# Patient Record
Sex: MALE | Race: BLACK OR AFRICAN AMERICAN | NOT HISPANIC OR LATINO
[De-identification: names, ages, dates, MRNs, and addresses within clinical notes are randomized per-mention and may not be internally consistent; named-entity substitution may affect disease eponyms.]

---

## 2022-09-01 ENCOUNTER — APPOINTMENT (OUTPATIENT)
Dept: ORTHOPEDIC SURGERY | Facility: CLINIC | Age: 52
End: 2022-09-01

## 2022-09-01 VITALS — WEIGHT: 255 LBS | BODY MASS INDEX: 37.77 KG/M2 | HEIGHT: 69 IN

## 2022-09-01 DIAGNOSIS — S93.492A SPRAIN OF OTHER LIGAMENT OF LEFT ANKLE, INITIAL ENCOUNTER: ICD-10-CM

## 2022-09-01 DIAGNOSIS — S93.412A SPRAIN OF CALCANEOFIBULAR LIGAMENT OF LEFT ANKLE, INITIAL ENCOUNTER: ICD-10-CM

## 2022-09-01 PROCEDURE — 73610 X-RAY EXAM OF ANKLE: CPT | Mod: LT

## 2022-09-01 PROCEDURE — 99072 ADDL SUPL MATRL&STAF TM PHE: CPT

## 2022-09-01 PROCEDURE — 99203 OFFICE O/P NEW LOW 30 MIN: CPT

## 2022-09-01 NOTE — ASSESSMENT
[FreeTextEntry1] : 53 yo male presenting with left ATFL/CFL ankle sprain. X-rays demonstrating chronic avulsion fracture over lateral malleolus. No acute orthopaedic intervention. \par -LLE WBAT in lace up ankle support, rx given\par -Activities as tolerated, avoid strenuous/impact related activities at this time\par -Rx PT given today\par -Patient able to work on a light duty status at this time\par -Rest, ice, compression, elevation, NSAIDs PRN for pain. Discussed with patient that she should only take NSAIDs PRN as taking meds chronically can cause kidney damage. Patient understands\par -All questions answered \par -F/u 4 weeks\par

## 2022-09-01 NOTE — HISTORY OF PRESENT ILLNESS
[Work related] : work related [Sudden] : sudden [4] : 4 [Dull/Aching] : dull/aching [Throbbing] : throbbing [Intermittent] : intermittent [Household chores] : household chores [Leisure] : leisure [Sleep] : sleep [Social interactions] : social interactions [Rest] : rest [Not working due to injury] : Work status: not working due to injury [de-identified] : Patient is here for his left ankle. 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 Patient went to [facility] in [place]. Patient states he is having lateral pain that is throbbing.  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

## 2022-09-01 NOTE — WORK
[Sprain/Strain] : sprain/strain [Was the competent medical cause of the injury] : was the competent medical cause of the injury [Are consistent with the injury] : are consistent with the injury [Consistent with my objective findings] : consistent with my objective findings [Partial] : partial [Does not reveal pre-existing condition(s) that may affect treatment/prognosis] : does not reveal pre-existing condition(s) that may affect treatment/prognosis [Can return to work with limitations on ______] : can return to work with limitations on [unfilled] [Patient] : patient [No Rx restrictions] : No Rx restrictions. [I provided the services listed above] :  I provided the services listed above. [Light Work:] : Light Work: Exerting up to 20 pounds of force occasionally, and/or up to 10 pounds of force frequently and/or negligible amount of force constantly to move objects. Physical demand requirements are in excess of those for Sedentary Work. Even though the weight lifted may only be a negligible amount, a job should be rated Light Work: (1) when it requires walking or standing to a significant degree: or (2) when it requires sitting most of the time but entails pushing and/or pulling of arm or leg controls: and/or (3) when the job requires working at a production rate pace entailing the constant pushing and/or pulling of materials even though the weight of those materials is negligible. NOTE: The constant stress of maintaining a production rate pace, especially in an industrial setting, can be and is physically demanding of a worker even though the amount of force exerted is negligible. [FreeTextEntry1] : Good

## 2022-09-01 NOTE — PHYSICAL EXAM
[de-identified] : Examination of the left foot and ankle is as follows:\par INSPECTION: swelling, moderate swelling of dorsal foot and lateral ankle, no ecchymosis, but no abrasion, laceration, no erythema\par PALPATION: lateral ligament tenderness, distal fibula tenderness\par ROM: plantarflexion 20 degrees, inversion 15 degrees, eversion 10 degrees, dorsiflexion 10 degrees\par STRENGTH: dorsiflexion 4/5, plantar flexion 4/5, inversion 4/5, eversion 4/5, EHL 5/5, FHL 5/5\par VASCULAR: dorsalis pedis pulse: 2+, posterior tibialis pulse: 2+\par NEURO: Sensation present to light touch in all distributions\par GAIT: antalgic, ambulation with LLE post-op shoe and crutches\par \par X-rays of the left ankle is as follows: \par Ankle 3 view: Chronic avulsion fracture over distal fibula. No acute fractures, subluxations or dislocations. No major abnormalities.

## 2022-10-06 ENCOUNTER — APPOINTMENT (OUTPATIENT)
Dept: ORTHOPEDIC SURGERY | Facility: CLINIC | Age: 52
End: 2022-10-06

## 2022-10-06 VITALS — HEIGHT: 69 IN | BODY MASS INDEX: 37.77 KG/M2 | WEIGHT: 255 LBS

## 2022-10-06 DIAGNOSIS — S93.412D SPRAIN OF CALCANEOFIBULAR LIGAMENT OF LEFT ANKLE, SUBSEQUENT ENCOUNTER: ICD-10-CM

## 2022-10-06 DIAGNOSIS — S93.492D SPRAIN OF OTHER LIGAMENT OF LEFT ANKLE, SUBSEQUENT ENCOUNTER: ICD-10-CM

## 2022-10-06 PROCEDURE — 99213 OFFICE O/P EST LOW 20 MIN: CPT

## 2022-10-06 PROCEDURE — 99072 ADDL SUPL MATRL&STAF TM PHE: CPT

## 2022-10-06 NOTE — HISTORY OF PRESENT ILLNESS
[Work related] : work related [Sudden] : sudden [Intermittent] : intermittent [Sleep] : sleep [Rest] : rest [Not working due to injury] : Work status: not working due to injury [0] : 0 [de-identified] : Patient is here for his left ankle. Patient is being treated for Sprain of calcaneofibular ligament and Sprain of anterior talofibular ligament. Patient states he has been going to PT 2x a week. Patient states he has no pain and no complaints at this time.  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

## 2022-10-06 NOTE — ASSESSMENT
[FreeTextEntry1] : 53 yo male presenting for f/u of left ATFL/CFL ankle sprain. \par -LLE WBAT\par -Activities as tolerated, no restrictions\par -Patient able to return to work full duty without restrictions on Friday, 10/7/22, note given today\par -Rest, ice, compression, elevation, NSAIDs PRN for pain. Discussed with patient that she should only take NSAIDs PRN as taking meds chronically can cause kidney damage. Patient understands\par -All questions answered \par -F/u PRN\par

## 2022-10-06 NOTE — PHYSICAL EXAM
[de-identified] : Examination of the left foot and ankle is as follows:\par INSPECTION: mild lateral ankle swelling, no ecchymosis, but no abrasion, laceration, no erythema\par PALPATION: no lateral ligament tenderness, no lateral malleolar tenderness\par ROM: plantarflexion 40 degrees, inversion 30 degrees, eversion 20 degrees, dorsiflexion 20 degrees\par STRENGTH: dorsiflexion 5/5, plantar flexion 5/5, inversion 5/5, eversion 5/5, EHL 5/5, FHL 5/5\par VASCULAR: dorsalis pedis pulse: 2+, posterior tibialis pulse: 2+\par NEURO: Sensation present to light touch in all distributions\par GAIT: non- antalgic, ambulation without assistive devices

## 2022-10-06 NOTE — WORK
[Sprain/Strain] : sprain/strain [Was the competent medical cause of the injury] : was the competent medical cause of the injury [Are consistent with the injury] : are consistent with the injury [Consistent with my objective findings] : consistent with my objective findings [Does not reveal pre-existing condition(s) that may affect treatment/prognosis] : does not reveal pre-existing condition(s) that may affect treatment/prognosis [Patient] : patient [No Rx restrictions] : No Rx restrictions. [I provided the services listed above] :  I provided the services listed above. [Can return to work with limitations on ______] : can return to work with limitations on [unfilled] [FreeTextEntry1] : no temporary impairment [Very Heavy Work:] : Very Heavy Work: Exerting in excess of 100 pounds of force occasionally, and/or in excess of 50 pounds of force frequently, and/or in excess of 20 pounds of force constantly to move objects. Physical demand requirements are in excess of those for Heavy Work

## 2023-05-07 ENCOUNTER — FORM ENCOUNTER (OUTPATIENT)
Age: 53
End: 2023-05-07

## 2023-05-18 ENCOUNTER — APPOINTMENT (OUTPATIENT)
Dept: ORTHOPEDIC SURGERY | Facility: CLINIC | Age: 53
End: 2023-05-18
Payer: COMMERCIAL

## 2023-05-18 VITALS — BODY MASS INDEX: 39.25 KG/M2 | HEIGHT: 69 IN | WEIGHT: 265 LBS

## 2023-05-18 DIAGNOSIS — M17.11 UNILATERAL PRIMARY OSTEOARTHRITIS, RIGHT KNEE: ICD-10-CM

## 2023-05-18 PROCEDURE — 73562 X-RAY EXAM OF KNEE 3: CPT | Mod: RT

## 2023-05-18 PROCEDURE — 99213 OFFICE O/P EST LOW 20 MIN: CPT

## 2023-05-18 NOTE — HISTORY OF PRESENT ILLNESS
[4] : 4 [Throbbing] : throbbing [Intermittent] : intermittent [Leisure] : leisure [Work] : work [Full time] : Work status: full time [Gradual] : gradual [Nothing helps with pain getting better] : Nothing helps with pain getting better [de-identified] : Patient presents today with right knee pain ongoing for years.  Reports being diagnosed with arthritis. Patient states he lost weight but the pain persists. Reports pain and stiffness when driving. Patient denies pain meds. Reports using Lidocaine patches in the past with no relief. Denies previous treatments to the knee.  [] : no [FreeTextEntry1] : Right knee  [de-identified] : driving [de-identified] :

## 2023-05-18 NOTE — PHYSICAL EXAM
[de-identified] : Examination of the right knee is as follows:\par INSPECTION: no effusion, no ecchymosis, no erythema, no atrophy, no deformities of quad tendon or of patellar tendon\par PALPATION: mild medial joint line tenderness, but no palpable mass, no obvious defects, no increased warmth, no calf tenderness\par ROM: flexion 125 degrees, but extension 0 degrees\par STRENGTH: quadriceps 5/5, hamstring 5/5\par TESTING: ligamentously stable\par NEURO: light touch is intact throughout\par GAIT: non-antalgic, but patient ambulates without assistive device\par \par X-rays of the right knee is as follows: \par Knee 3 view in the anteroposterior, lateral, skyline views: moderate to severe tricompartmental OA with medial joint space narrowing

## 2023-05-18 NOTE — REASON FOR VISIT
supine supine Trendelenburg lateral recumbent supine supine supine Trendelenburg supine supine supine supine supine Trendelenburg supine supine [FreeTextEntry2] : New Problem